# Patient Record
Sex: FEMALE | Race: WHITE
[De-identification: names, ages, dates, MRNs, and addresses within clinical notes are randomized per-mention and may not be internally consistent; named-entity substitution may affect disease eponyms.]

---

## 2019-12-17 ENCOUNTER — HOSPITAL ENCOUNTER (OUTPATIENT)
Dept: HOSPITAL 61 - PCVCIMAG | Age: 39
Discharge: HOME | End: 2019-12-17
Attending: INTERNAL MEDICINE
Payer: COMMERCIAL

## 2019-12-17 DIAGNOSIS — R07.9: Primary | ICD-10-CM

## 2019-12-17 DIAGNOSIS — E78.5: ICD-10-CM

## 2019-12-17 PROCEDURE — 93351 STRESS TTE COMPLETE: CPT

## 2019-12-17 PROCEDURE — 93325 DOPPLER ECHO COLOR FLOW MAPG: CPT

## 2019-12-19 NOTE — PCVCIMAG
--------------- APPROVED REPORT --------------





Study performed:  12/17/2019 11:50:31



Exam:  Stress Echocardiogram

Indication: Chest pain, abn EKG

Patient Location: Echo lab

Stress Nurse: Lainey Murrieta RN

Room #: 2

Status: routine



Ht: 5 ft 8 in  

HR: 42 bpm      BP: 92/56 mmHg

Rhythm: Bradycardia



Medical History

Medical History: ruptured diverticulitis w/ infection

Medications: none

Cardiac Risk Factors: Hyperlipidemia

Previous Cardiac Procedures: none

Pretest Chest Pain Characteristics: No chest pain

Exercise History: Sedentary



Procedure

The patient underwent an Exercise Stress Test using the Roger 

Protocol. Blood pressure, heart rate, and EKG were monitored.

An Echocardiogram was performed by technician in four stages in quad 

fashion.  At peak stress, four selected images were obtained and 

placed side by side with resting images for comparison.



Stress Test Details

Stress Test:  Exercise stress testing was performed using a Roger 

protocol.

HR

Resting HR:            42 bpmMax Heart Rate (APMHR): 181 bpm 

Max HR Achieved:  164 bpmTarget HR (85% APMHR): 153 bpm

% of APMHR:         90

Recovery HR:            102 bpm

HR response to stress: Normal HR response to stress,dhruv while 

supine, NSR upright



BP

Resting BP:  92/56 mmHg

Max BP:       144/68 mmHg

Recovery BP:       118/58 mmHg

BP response to stress: Normal blood pressure response to stress. 

Hypotensive at rest

ECG

Resting ECG:  Sinus Bradycardia- supine, NSR - upright

Stress ECG:     Sinus Rhythm, NSSTT changes

ST Change: Upsloping ST depression

Maximum ST Deviation: -1.25 mm

Arrhythmia:    rare PVC

Recovery ECG: Sinus Rhythm, NSSTT changes

Recovery ST Change: Non-ischemic

Recovery ST Deviation: -1.2 mm

Recovery Arrhythmia: None



Clinical

Reason for Termination: Maximal effort

Stress Symptoms: Leg Fatigue

Exercise duration: 9 min 00 sec

Highest Stage Achieved: Stage 3: 3.4 mph at 14% grade. 

Exercise capacity: 10.1 METs

Overall Exercise Capacity for Age: Normal

Scale: Sedentary

Angina Score: None

No complications.



Stress ECG Conclusion

1. subjectively negative for ischemia

2. electrically negative for ischemia

3. no inducible arrhythmias

[facility] Treadmill Score is 15.3 which is Low risk.



Pre-Stress Echo

The resting Echocardiogram showed normal left ventricular 

contractility with an estimated Ejection Fraction of about 55-60%. 

Normal wall motion in all segments on baseline images.



Post-Stress Echo

The stress Echocardiogram showed normal left ventricular 

contractility with an estimated Ejection Fraction of about 65-70%. 

Normal augmentation of wall motion in all segments on post stress 

images.



Clinical

No clinical or ECG evidence for ischemia.



Conclusion

Clinical Response:  Non-ischemic

Exercise Capacity:  Average

Stress ECG Response:  Non-ischemic

Stress Echo Images:  Non-ischemic

No clinical, EKG or echocardiographic evidence for ischemia. 

No echocardiographic evidence for exercise induced ischemia.

Normal stress echocardiogram with maximal exercise stress.

Normal color doppler. No stenosis or regurgitation seen in the mitral,

aortic,tricuspid or pulmonic valves.



1. low risk patient

No prior study available for comparison.



<Conclusion>

No clinical, EKG or echocardiographic evidence for ischemia. 

No echocardiographic evidence for exercise induced ischemia.

Normal stress echocardiogram with maximal exercise stress.

Normal color doppler. No stenosis or regurgitation seen in the mitral,

aortic,tricuspid or pulmonic valves.



1. low risk patient